# Patient Record
Sex: MALE | Race: WHITE | NOT HISPANIC OR LATINO | URBAN - NONMETROPOLITAN AREA
[De-identification: names, ages, dates, MRNs, and addresses within clinical notes are randomized per-mention and may not be internally consistent; named-entity substitution may affect disease eponyms.]

---

## 2017-10-27 NOTE — PATIENT DISCUSSION
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 CALL BACK IMMEDIATELY FOR ANY WORSENING SYMPTOMS. RETURN FOR FOLLOW-UP AS SCHEDULED.

## 2017-12-08 NOTE — PATIENT DISCUSSION
[place] Visual Field 36 point screen: I have reviewed the visual fields both taped and untaped on this patient which demonstrate significant obstruction of the patient's peripheral visual field on both eyes.

## 2017-12-08 NOTE — PATIENT DISCUSSION
PHOTOGRAPHS: I have reviewed the external ocular photographs of this patient which show the following: significant dermatochalasis of both upper eyelids.

## 2018-02-20 NOTE — PATIENT DISCUSSION
Resume normal activity. Resume any medications that were discontinued for surgery. Stop cold compresses. Artificial tears prn burning/itching/blurry vision. Wash incisions/ lash line once daily with baby shampoo.

## 2018-02-20 NOTE — PATIENT DISCUSSION
Reviewed skin care/ sun avoidance at length. Written instructions given. Eye cream given. Follow up 4-6 weeks, sooner for problems.

## 2018-03-27 NOTE — PATIENT DISCUSSION
Patient is continuing to heal well following surgery. Excellent cosmetic outcome. Reviewed skin care and sun avoidance with the patient today. Follow up.

## 2019-05-07 ENCOUNTER — IMPORTED ENCOUNTER (OUTPATIENT)
Dept: URBAN - NONMETROPOLITAN AREA CLINIC 1 | Facility: CLINIC | Age: 59
End: 2019-05-07

## 2019-05-07 PROBLEM — H52.4: Noted: 2019-05-07

## 2019-05-07 PROBLEM — H52.03: Noted: 2019-05-07

## 2019-05-07 PROBLEM — E11.9: Noted: 2019-05-07

## 2019-05-07 PROBLEM — H25.813: Noted: 2019-05-07

## 2019-05-07 PROCEDURE — 99204 OFFICE O/P NEW MOD 45 MIN: CPT

## 2019-05-07 PROCEDURE — 92015 DETERMINE REFRACTIVE STATE: CPT

## 2019-05-07 NOTE — PATIENT DISCUSSION
Hyperopia/Presbyopia-  Discussed refractive status w/ pt today-  Amblyopia noted OS pt admits for several years (childhood)-  MR done new GLRx given-  Monitor yearly or PRN Type II DM dx 2019-  Discussed findings w/ pt today-  Last A1C was 12 but pt reports BS over the last few days -  Currently on Metfomin daily-  Stressed importance of good blood sugar control and how it can affect the overall health of the eye. -  No diabetic retinopathy noted on exam today-  Will send notes to PCP:  [address] in [place]-  Monitor closely for changesCataracts OU-  Discussed findings w/ pt today-  Signs/symptoms of progression discussed-  Monitor for progression PRN; Dr's Notes: PCP:  [facility] in [place] MR  5/7/19DFE  5/7/19

## 2020-06-03 NOTE — PATIENT DISCUSSION
MILD DRY EYES: PRESCRIBED ARTIFICIAL TEARS BID - QID, OU RETURN FOR FOLLOW-UP AS SCHEDULED OR SOONER IF SYMPTOMS WORSEN. No

## 2020-07-23 ENCOUNTER — IMPORTED ENCOUNTER (OUTPATIENT)
Dept: URBAN - NONMETROPOLITAN AREA CLINIC 1 | Facility: CLINIC | Age: 60
End: 2020-07-23

## 2020-07-23 PROBLEM — H52.4: Noted: 2020-07-23

## 2020-07-23 PROBLEM — H25.813: Noted: 2020-07-23

## 2020-07-23 PROBLEM — E11.9: Noted: 2020-07-23

## 2020-07-23 PROCEDURE — S0621 ROUTINE OPHTHALMOLOGICAL EXA: HCPCS

## 2020-07-23 NOTE — PATIENT DISCUSSION
Hyperopia/Presbyopia-  Discussed refractive status w/ pt today-  Amblyopia noted OS pt admits for several years (childhood)-  MR done new GLRx given-  Monitor yearly or PRN Type II DM dx 2019-  Discussed findings w/ pt today-  Stressed importance of good blood sugar control and how it can affect the overall health of the eye. -  No diabetic retinopathy noted on exam today-  Monitor closely for changesCataracts OU- Discussed diagnosis in detail with patient- Discussed signs and symptoms of progression- Discussed UV protection- No treatment needed at this time - Continue to monitor; 's Notes: PCP:  [facility] in [facility] MR  5/7/19DFE  5/7/19

## 2021-06-09 NOTE — PATIENT DISCUSSION
CATARACTS, OU - VISUALLY SIGNIFICANT. RIGHT EYE THEN LEFT. PT TO CALL WHEN READY TO PROCEED WITH SURGERY.

## 2021-09-29 NOTE — PATIENT DISCUSSION
Posterior Vitreous Detachment Counseling: I have discussed the diagnosis of PVD with the patient and the possibility of a retinal tear or detachment.

## 2021-09-29 NOTE — PATIENT DISCUSSION
Epiretinal Membrane Counseling:  The diagnosis and natural history of epiretinal membrane was discussed with the patient including the risk of

## 2021-09-29 NOTE — PATIENT DISCUSSION
progression with retinal traction resulting in visual distortion.  The patient is instructed to call back immediately if any vision changes, and keep follow

## 2021-09-29 NOTE — PATIENT DISCUSSION
also explained that there is no guarantee that removing the cataract will improve their vision.  The patient understands and desires to follow for now

## 2021-09-29 NOTE — PATIENT DISCUSSION
the patient a new spectacle prescription to fill if desires. Return to follow up as schedled or sooner if symptoms arise.

## 2021-09-29 NOTE — PATIENT DISCUSSION
Considering Surgery Counseling: I have discussed the option of scheduling surgery versus following, as well as the risks, benefits and alternatives of

## 2021-09-29 NOTE — PATIENT DISCUSSION
Cataract: Not visually significant to proceed with surgery at this time. I have discussed continuing with current spectacles vs updating.  I have offered

## 2021-09-29 NOTE — PATIENT DISCUSSION
to contact us immediately if they noticed any of the signs or symptoms of retinal detachment as noted above as the prognosis for any potential

## 2021-09-29 NOTE — PATIENT DISCUSSION
of light, increase or change in floaters, decreased vision, part of the vision missing, veils or any other ocular concerns.  I have further explained not all

## 2021-09-29 NOTE — PATIENT DISCUSSION
The signs/symptoms of a retinal tear/detachment were reviewed to include but not limited to redness, discharge, pain, increase or change in flashes

## 2021-09-29 NOTE — PATIENT DISCUSSION
patients who develop a tear or detachment have notable symptoms, therefore, compliance with return visits are necessary.  The patient was instructed

## 2021-09-29 NOTE — PATIENT DISCUSSION
cataract surgery with the patient. It was explained that the surgery is elective, there is no rush and there is no harm in waiting to have surgery.  It was

## 2021-10-06 NOTE — PATIENT DISCUSSION
VISUALLY SIGNIFICANT: I have discussed the option of glasses versus cataract surgery versus following. It was explained that when the patients vision no longer meets their visual needs and a glasses prescription does not improve visual symptoms, the option of cataract surgery is a reasonable next step. It was explained that there is no guarantee that removing the cataract will improve their visual symptoms, however; it is believed that the cataract is contributing to the patient's visual impairment and surgery may improve both the visual and functional status of the patient. The risks, benefits and alternatives of surgery were discussed with the patient. After this discussion, the patient desires to proceed with cataract surgery with implantation of an intraocular lens to improve vision.

## 2021-10-06 NOTE — PATIENT DISCUSSION
"REFRACTIVE CATARACT SURGERY: Refractive error / Presbyopic Correction Counseling: Refractive Error - a problem focusing light accurately onto the retina due to the shape of the eye. Presbyopia is a gradual, age-related loss of the eye's ability to focus actively on nearby objects. The most common types of refractive error are near-sightedness; far-sightedness, astigmatism, and presbyopia. I have discussed the options for refractive surgery to decrease dependency on glasses and/or contact lenses after surgery. Multifocal, Trifocal and Extended-depth-of-focus lens (EDOF) - It was explained that multifocal/trifocal and extended depth of focus lenses split light and this can be associated with a decrease in contrast sensitivity, depth of focus, a double image and ghosting which may or may not resolve over time. Multifocal/trifocal lenses are lighting dependent. In low or dim lighting, glasses may be needed for optimal near vision. It was explained that rings, halos, starbursts or spider webs around point sources of light (headlights, tail lights, street lights, neon signs, the moon, stars, etc.) will be present indefinitely after multifocal/trifocal/EDOF lens implantation (with the exception of the Viviity MF IOL) While the majority of patients do not find this limit's their night time activities to include driving, in rare instances, it can.  It was explained that these lenses are designed to satisfy a defined area of near vision, or ""sweet spot""

## 2022-04-10 ASSESSMENT — TONOMETRY
OS_IOP_MMHG: 17
OD_IOP_MMHG: 16
OD_IOP_MMHG: 17
OS_IOP_MMHG: 18

## 2022-04-10 ASSESSMENT — VISUAL ACUITY
OS_SC: 20/40
OD_SC: 20/20-1
OS_PH: 20/25
OS_SC: 20/30-1
OD_SC: 20/25-

## 2024-04-08 ENCOUNTER — EMERGENCY VISIT (OUTPATIENT)
Dept: URBAN - NONMETROPOLITAN AREA CLINIC 1 | Facility: CLINIC | Age: 64
End: 2024-04-08

## 2024-04-08 DIAGNOSIS — S05.02XA: ICD-10-CM

## 2024-04-08 PROCEDURE — 99203 OFFICE O/P NEW LOW 30 MIN: CPT

## 2024-04-08 RX ORDER — CIPROFLOXACIN 3 MG/ML: 1 SOLUTION OPHTHALMIC

## 2024-04-08 ASSESSMENT — VISUAL ACUITY
OU_CC: 20/25-
OD_CC: 20/25-2
OS_CC: 20/32+2

## 2024-04-08 ASSESSMENT — TONOMETRY
OD_IOP_MMHG: 18
OS_IOP_MMHG: 18

## 2024-04-12 ENCOUNTER — FOLLOW UP (OUTPATIENT)
Dept: URBAN - NONMETROPOLITAN AREA CLINIC 1 | Facility: CLINIC | Age: 64
End: 2024-04-12

## 2024-04-12 DIAGNOSIS — S05.02XD: ICD-10-CM

## 2024-04-12 PROCEDURE — 99213 OFFICE O/P EST LOW 20 MIN: CPT

## 2024-04-12 ASSESSMENT — VISUAL ACUITY
OS_CC: 20/40
OU_CC: 20/25-2
OD_CC: 20/25-2

## 2024-04-12 ASSESSMENT — TONOMETRY
OD_IOP_MMHG: 17
OS_IOP_MMHG: 17

## 2024-06-12 ENCOUNTER — EMERGENCY VISIT (OUTPATIENT)
Dept: URBAN - NONMETROPOLITAN AREA CLINIC 1 | Facility: CLINIC | Age: 64
End: 2024-06-12

## 2024-06-12 DIAGNOSIS — S05.02XA: ICD-10-CM

## 2024-06-12 PROCEDURE — 99213 OFFICE O/P EST LOW 20 MIN: CPT

## 2024-06-12 ASSESSMENT — TONOMETRY
OS_IOP_MMHG: 18
OD_IOP_MMHG: 18

## 2024-06-12 ASSESSMENT — VISUAL ACUITY
OU_SC: 20/30-1
OD_SC: 20/30-1
OS_SC: 20/40

## 2024-07-18 ENCOUNTER — COMPREHENSIVE EXAM (OUTPATIENT)
Dept: URBAN - NONMETROPOLITAN AREA CLINIC 1 | Facility: CLINIC | Age: 64
End: 2024-07-18

## 2024-07-18 DIAGNOSIS — H52.4: ICD-10-CM

## 2024-07-18 PROCEDURE — 92015 DETERMINE REFRACTIVE STATE: CPT

## 2024-07-18 PROCEDURE — 92014 COMPRE OPH EXAM EST PT 1/>: CPT

## 2024-07-18 ASSESSMENT — VISUAL ACUITY
OD_BAT: 20/50
OS_CC: 20/40
OS_BAT: 20/50
OS_PH: 20/32
OD_CC: 20/20-2
OU_CC: 20/20-1

## 2024-07-18 ASSESSMENT — TONOMETRY
OD_IOP_MMHG: 15
OS_IOP_MMHG: 15

## 2025-07-23 ENCOUNTER — COMPREHENSIVE EXAM (OUTPATIENT)
Age: 65
End: 2025-07-23

## 2025-07-23 DIAGNOSIS — H52.4: ICD-10-CM

## 2025-07-23 PROCEDURE — 92014 COMPRE OPH EXAM EST PT 1/>: CPT

## 2025-07-23 PROCEDURE — 92015 DETERMINE REFRACTIVE STATE: CPT
